# Patient Record
Sex: FEMALE | Race: WHITE | NOT HISPANIC OR LATINO | Employment: FULL TIME | URBAN - METROPOLITAN AREA
[De-identification: names, ages, dates, MRNs, and addresses within clinical notes are randomized per-mention and may not be internally consistent; named-entity substitution may affect disease eponyms.]

---

## 2023-12-27 ENCOUNTER — OFFICE VISIT (OUTPATIENT)
Dept: PRIMARY CARE | Facility: CLINIC | Age: 48
End: 2023-12-27
Payer: COMMERCIAL

## 2023-12-27 VITALS
SYSTOLIC BLOOD PRESSURE: 110 MMHG | WEIGHT: 180.78 LBS | BODY MASS INDEX: 30.08 KG/M2 | HEART RATE: 74 BPM | DIASTOLIC BLOOD PRESSURE: 68 MMHG | OXYGEN SATURATION: 98 %

## 2023-12-27 DIAGNOSIS — R39.9 UTI SYMPTOMS: Primary | ICD-10-CM

## 2023-12-27 LAB
POC APPEARANCE, URINE: ABNORMAL
POC BILIRUBIN, URINE: NEGATIVE
POC BLOOD, URINE: ABNORMAL
POC COLOR, URINE: ABNORMAL
POC GLUCOSE, URINE: NEGATIVE MG/DL
POC KETONES, URINE: ABNORMAL MG/DL
POC LEUKOCYTES, URINE: ABNORMAL
POC NITRITE,URINE: POSITIVE
POC PH, URINE: 6.5 PH
POC PROTEIN, URINE: ABNORMAL MG/DL
POC SPECIFIC GRAVITY, URINE: >=1.03
POC UROBILINOGEN, URINE: 1 EU/DL

## 2023-12-27 PROCEDURE — 87086 URINE CULTURE/COLONY COUNT: CPT

## 2023-12-27 PROCEDURE — 87186 SC STD MICRODIL/AGAR DIL: CPT

## 2023-12-27 PROCEDURE — 99213 OFFICE O/P EST LOW 20 MIN: CPT | Performed by: NURSE PRACTITIONER

## 2023-12-27 PROCEDURE — 81003 URINALYSIS AUTO W/O SCOPE: CPT | Performed by: NURSE PRACTITIONER

## 2023-12-27 RX ORDER — NITROFURANTOIN 25; 75 MG/1; MG/1
100 CAPSULE ORAL 2 TIMES DAILY
Qty: 10 CAPSULE | Refills: 0 | Status: SHIPPED | OUTPATIENT
Start: 2023-12-27 | End: 2024-01-01

## 2023-12-27 NOTE — PROGRESS NOTES
Subjective   Patient ID: Poonam Rodas is a 48 y.o. female who presents for UTI symptoms since Sunday. C/o frequency, burning. Denies jennifer blood in urine, abdominal pain, fevers, n/v.     HPI   Insomnia: She falls asleep easily around 11 pm, wakes at 4-6AM every night normally can fall back asleep after 2 hours awake. No napping. When she wakes she will read or read on her phone. Sometimes wakes due to need to urinate.      Acne: using retinol cream.   exercise induced asthma: using Proair PRN   insomnia: using benadryl 25-50 mg QHS   GYN: Dr. Alves at [facility]. Using nuvaring. Paps UTD and normal. Mammograms UTD and normal.      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 Living at home with  and 5 children youngest 10 and oldest 26 (7 children total). Everyone living in [place].   Exercise: yoga and walk/running outside 3 days/week.   Tobacco use: none   Alcohol use: socially, rarely   Review of Systems    Objective   /68   Pulse 74   Wt 82 kg (180 lb 12.4 oz)   SpO2 98%   BMI 30.08 kg/m²     Physical Exam  Constitutional:       Appearance: Normal appearance.   Abdominal:      General: Abdomen is flat.      Palpations: Abdomen is soft.      Tenderness: There is no right CVA tenderness, left CVA tenderness or guarding.   Neurological:      Mental Status: She is alert.         Assessment/Plan   Diagnoses and all orders for this visit:  UTI symptoms  -     Urine Culture; Future  -     POCT UA Automated manually resulted  -     nitrofurantoin, macrocrystal-monohydrate, (Macrobid) 100 mg capsule; Take 1 capsule (100 mg) by mouth 2 times a day for 5 days.  insomnia: restart benadryl 25-50 mg QHS. Discussed sleep hygiene. FU if needed.   acne: using retinol cream nightly   exercise induced asthma: proair prn, rarely uses.   health maintenance: paps/mammograms UTD per pt.   FU annually for cpe due 11/2/23  Patient verbalized understanding and agree to plan of care.  Instructed to call or schedule  appointment if any changes or as needed.

## 2023-12-29 LAB — BACTERIA UR CULT: ABNORMAL

## 2024-08-19 NOTE — PROGRESS NOTES
Subjective   Patient ID: Poonam Rodas is a 48 y.o. female who presents for digestive complaints. She c/o GERD, constipation, diarrhea. Typically 1 BM per day. Occassional bouts of diarrhea/constipation. No pain or discomfort. Some gas and bloating. Taking nexium PRN, but no regularly.     HPI   Insomnia: She falls asleep easily around 11 pm, wakes at 4-6AM every night normally can fall back asleep after 2 hours awake. No napping. When she wakes she will read or read on her phone. Sometimes wakes due to need to urinate.      Acne: using retinol cream.   exercise induced asthma: using Proair PRN   insomnia: using benadryl 25-50 mg QHS   GYN: Dr. Alves at [facility]. Using nuvaring. Paps UTD and normal. Mammograms UTD and normal.      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 Everyone living in [place].   Exercise: yoga and walk/running outside 3 days/week.   Tobacco use: none   Alcohol use: socially, rarely   Review of Systems    Objective   /74   Pulse 77   Wt 85 kg (187 lb 6.3 oz)   SpO2 98%   BMI 31.18 kg/m²     Physical Exam  Constitutional:       Appearance: Normal appearance.   Abdominal:      General: Abdomen is flat.      Palpations: Abdomen is soft.      Tenderness: There is no right CVA tenderness, left CVA tenderness or guarding.   Neurological:      Mental Status: She is alert.         Assessment/Plan   Diagnoses and all orders for this visit:  IBS: discussed OTC symptomatic options - stool softeners, imodium PRN, metamucil. Screening colonoscopy ordered.   GERD: start omperazole 20 mg QAM x2-8weeks; if no improvement will need EGD   insomnia: restart benadryl 25-50 mg QHS. Discussed sleep hygiene. FU if needed.   acne: using retinol cream nightly   exercise induced asthma: proair prn, rarely uses.   health maintenance: paps/mammograms UTD per pt. Colonoscopy ordered.   FU annually for cpe due  11/2/23  Patient verbalized understanding and agree to plan of care.  Instructed to call or schedule appointment if any changes or as needed.

## 2024-08-20 ENCOUNTER — APPOINTMENT (OUTPATIENT)
Dept: PRIMARY CARE | Facility: CLINIC | Age: 49
End: 2024-08-20
Payer: COMMERCIAL

## 2024-08-20 VITALS
DIASTOLIC BLOOD PRESSURE: 74 MMHG | BODY MASS INDEX: 31.18 KG/M2 | HEART RATE: 77 BPM | SYSTOLIC BLOOD PRESSURE: 114 MMHG | WEIGHT: 187.39 LBS | OXYGEN SATURATION: 98 %

## 2024-08-20 DIAGNOSIS — Z12.11 COLON CANCER SCREENING: Primary | ICD-10-CM

## 2024-08-20 DIAGNOSIS — K21.9 GASTROESOPHAGEAL REFLUX DISEASE WITHOUT ESOPHAGITIS: ICD-10-CM

## 2024-08-20 PROCEDURE — 1036F TOBACCO NON-USER: CPT | Performed by: NURSE PRACTITIONER

## 2024-08-20 PROCEDURE — 99213 OFFICE O/P EST LOW 20 MIN: CPT | Performed by: NURSE PRACTITIONER

## 2024-08-20 RX ORDER — OMEPRAZOLE 20 MG/1
20 CAPSULE, DELAYED RELEASE ORAL DAILY
Qty: 60 CAPSULE | Refills: 0 | Status: SHIPPED | OUTPATIENT
Start: 2024-08-20

## 2024-08-20 ASSESSMENT — PATIENT HEALTH QUESTIONNAIRE - PHQ9
2. FEELING DOWN, DEPRESSED OR HOPELESS: NOT AT ALL
1. LITTLE INTEREST OR PLEASURE IN DOING THINGS: NOT AT ALL
SUM OF ALL RESPONSES TO PHQ9 QUESTIONS 1 AND 2: 0

## 2024-08-20 ASSESSMENT — ENCOUNTER SYMPTOMS
LOSS OF SENSATION IN FEET: 0
DEPRESSION: 0
OCCASIONAL FEELINGS OF UNSTEADINESS: 0

## 2024-08-20 NOTE — PATIENT INSTRUCTIONS
Constipation: consider OTC stool softeners such as docusate sodium and/or miralax  Diarrhea: OK to take imodium as needed   Fiber supplement can help with regularity (metamucil)  Heart burn: omperazole 20 mg in the morning on an empty stomach for 2-8 weeks. If you cannot stop this we will want to order and EGD (upper endoscopy) which can be done at the same time as the colonoscopy    Make sure to schedule colonoscopy for colon cancer screening   Phone number [phone] [facility] CARE to schedule

## 2024-08-21 DIAGNOSIS — Z12.11 COLON CANCER SCREENING: ICD-10-CM

## 2024-08-21 RX ORDER — POLYETHYLENE GLYCOL 3350, SODIUM SULFATE ANHYDROUS, SODIUM BICARBONATE, SODIUM CHLORIDE, POTASSIUM CHLORIDE 236; 22.74; 6.74; 5.86; 2.97 G/4L; G/4L; G/4L; G/4L; G/4L
POWDER, FOR SOLUTION ORAL
Qty: 4000 ML | Refills: 0 | Status: SHIPPED | OUTPATIENT
Start: 2024-08-21

## 2024-09-19 DIAGNOSIS — K21.9 GASTROESOPHAGEAL REFLUX DISEASE WITHOUT ESOPHAGITIS: ICD-10-CM

## 2024-09-24 RX ORDER — OMEPRAZOLE 20 MG/1
20 CAPSULE, DELAYED RELEASE ORAL DAILY
Qty: 30 CAPSULE | Refills: 0 | Status: SHIPPED | OUTPATIENT
Start: 2024-09-24

## 2024-10-08 ENCOUNTER — PATIENT MESSAGE (OUTPATIENT)
Dept: PRIMARY CARE | Facility: CLINIC | Age: 49
End: 2024-10-08
Payer: COMMERCIAL

## 2024-10-16 ENCOUNTER — APPOINTMENT (OUTPATIENT)
Dept: PRIMARY CARE | Facility: CLINIC | Age: 49
End: 2024-10-16
Payer: COMMERCIAL

## 2024-10-16 DIAGNOSIS — L50.9 HIVES: Primary | ICD-10-CM

## 2024-10-16 PROCEDURE — 1036F TOBACCO NON-USER: CPT | Performed by: NURSE PRACTITIONER

## 2024-10-16 PROCEDURE — 99213 OFFICE O/P EST LOW 20 MIN: CPT | Performed by: NURSE PRACTITIONER

## 2024-10-16 RX ORDER — HYDROXYZINE HYDROCHLORIDE 25 MG/1
25 TABLET, FILM COATED ORAL 2 TIMES DAILY
Qty: 20 TABLET | Refills: 0 | Status: SHIPPED | OUTPATIENT
Start: 2024-10-16 | End: 2024-10-26

## 2024-10-16 RX ORDER — PREDNISONE 20 MG/1
20 TABLET ORAL DAILY
Qty: 7 TABLET | Refills: 0 | Status: SHIPPED | OUTPATIENT
Start: 2024-10-16 | End: 2024-10-23

## 2024-10-16 ASSESSMENT — PATIENT HEALTH QUESTIONNAIRE - PHQ9
1. LITTLE INTEREST OR PLEASURE IN DOING THINGS: NOT AT ALL
2. FEELING DOWN, DEPRESSED OR HOPELESS: NOT AT ALL
SUM OF ALL RESPONSES TO PHQ9 QUESTIONS 1 AND 2: 0

## 2024-10-16 ASSESSMENT — ENCOUNTER SYMPTOMS
OCCASIONAL FEELINGS OF UNSTEADINESS: 0
DEPRESSION: 0
DIARRHEA: 0
FATIGUE: 0
VOMITING: 0
FEVER: 0
EYE PAIN: 0
COUGH: 0
RHINORRHEA: 0
ANOREXIA: 0
LOSS OF SENSATION IN FEET: 0
NAIL CHANGES: 0
SHORTNESS OF BREATH: 0
SORE THROAT: 0

## 2024-10-16 NOTE — PROGRESS NOTES
An interactive audio and video telecommunication system which permits real time communications between the patient (at the originating site) and provider (at the distant site) was utilized to provide this telehealth service.    Verbal consent was requested and obtained from Poonam Rodas  on this date, 10/16/24  , for a telehealth visit.      Subjective   Patient ID: Poonam Rodas is a 49 y.o. female who presenting virtually with complaints of generalized hives for the past 2 weeks. She has gotten these in the past related to anxiety and stress, and thinks that is what is triggering the hives now. There are no new foods, products, hygiene products, meds. Normally hives resolve on their own, but no improvement over 2 weeks now. She has been taking zyrtec and benadryl. She is very uncomfortable. No s/sx infection.     Rash  This is a new problem. The current episode started 1 to 4 weeks ago. The problem is unchanged. The affected locations include the scalp, head, neck, chest, torso, back, abdomen, left hand, left hip, left upper leg, left leg, left ankle, right shoulder, right arm, right hand, right wrist, right fingers, right hip, right upper leg, right leg, right ankle and right foot. The rash is characterized by dryness, redness and itchiness. She was exposed to nothing. Pertinent negatives include no anorexia, congestion, cough, diarrhea, eye pain, facial edema, fatigue, fever, joint pain, nail changes, rhinorrhea, shortness of breath, sore throat or vomiting.      Insomnia: She falls asleep easily around 11 pm, wakes at 4-6AM every night normally can fall back asleep after 2 hours awake. No napping. When she wakes she will read or read on her phone. Sometimes wakes due to need to urinate.      Acne: using retinol cream.   exercise induced asthma: using Proair PRN   insomnia: using benadryl 25-50 mg QHS   GYN: Dr. Alves at [facility]. Using nuvaring. Paps UTD and normal. Mammograms UTD and normal.       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 Living at home with  and 5 children youngest 10 and oldest 26 (7 children total). Everyone living in [place].   Exercise: yoga and walk/running outside 3 days/week.   Tobacco use: none   Alcohol use: socially, rarely   Review of Systems   Constitutional:  Negative for fatigue and fever.   HENT:  Negative for congestion, rhinorrhea and sore throat.    Eyes:  Negative for pain.   Respiratory:  Negative for cough and shortness of breath.    Gastrointestinal:  Negative for anorexia, diarrhea and vomiting.   Musculoskeletal:  Negative for joint pain.   Skin:  Positive for rash. Negative for nail changes.       Objective   There were no vitals taken for this visit.    Physical Exam  Constitutional:       Appearance: Normal appearance.   Abdominal:      General: Abdomen is flat.      Palpations: Abdomen is soft.      Tenderness: There is no right CVA tenderness, left CVA tenderness or guarding.   Neurological:      Mental Status: She is alert.         Assessment/Plan   Diagnoses and all orders for this visit:    1. Hives (Primary)    - predniSONE (Deltasone) 20 mg tablet; Take 1 tablet (20 mg) by mouth once daily for 7 days.  Dispense: 7 tablet; Refill: 0  - hydrOXYzine HCL (Atarax) 25 mg tablet; Take 1 tablet (25 mg) by mouth 2 times a day for 10 days.  Dispense: 20 tablet; Refill: 0  She will call if NO improvement by Monday.     IBS: discussed OTC symptomatic options - stool softeners, imodium PRN, metamucil. Screening colonoscopy ordered.   GERD: start omperazole 20 mg QAM x2-8weeks; if no improvement will need EGD   insomnia: restart benadryl 25-50 mg QHS. Discussed sleep hygiene. FU if needed.   acne: using retinol cream nightly   exercise induced asthma: proair prn, rarely uses.   health maintenance: paps/mammograms UTD per pt. Colonoscopy ordered.   FU annually for cpe due 11/2/23  Patient verbalized understanding and agree to plan of care.  Instructed to call or  schedule appointment if any changes or as needed.

## 2024-10-17 ENCOUNTER — TELEPHONE (OUTPATIENT)
Dept: PRIMARY CARE | Facility: CLINIC | Age: 49
End: 2024-10-17

## 2024-10-17 DIAGNOSIS — K21.9 GASTROESOPHAGEAL REFLUX DISEASE WITHOUT ESOPHAGITIS: ICD-10-CM

## 2024-10-17 RX ORDER — OMEPRAZOLE 20 MG/1
20 CAPSULE, DELAYED RELEASE ORAL DAILY
Qty: 30 CAPSULE | Refills: 0 | OUTPATIENT
Start: 2024-10-17

## 2024-10-23 DIAGNOSIS — L50.1 ACUTE IDIOPATHIC URTICARIA: Primary | ICD-10-CM

## 2024-10-23 NOTE — TELEPHONE ENCOUNTER
pt still has hives and wants some instructions as to what she can do had virtual appt last week left a msg on Monday said that she is going into a [employer] holiday and will be off line tomorrow and Friday please call asap hives coming back  [phone]

## 2024-10-25 DIAGNOSIS — K21.9 GASTROESOPHAGEAL REFLUX DISEASE WITHOUT ESOPHAGITIS: ICD-10-CM

## 2024-10-28 ENCOUNTER — APPOINTMENT (OUTPATIENT)
Dept: PRIMARY CARE | Facility: CLINIC | Age: 49
End: 2024-10-28
Payer: COMMERCIAL

## 2024-10-28 VITALS
OXYGEN SATURATION: 98 % | SYSTOLIC BLOOD PRESSURE: 119 MMHG | HEIGHT: 65 IN | BODY MASS INDEX: 31.22 KG/M2 | WEIGHT: 187.39 LBS | DIASTOLIC BLOOD PRESSURE: 82 MMHG | HEART RATE: 92 BPM

## 2024-10-28 DIAGNOSIS — L50.9 HIVES: ICD-10-CM

## 2024-10-28 DIAGNOSIS — K21.9 GASTROESOPHAGEAL REFLUX DISEASE WITHOUT ESOPHAGITIS: ICD-10-CM

## 2024-10-28 DIAGNOSIS — Z12.11 COLON CANCER SCREENING: ICD-10-CM

## 2024-10-28 DIAGNOSIS — Z00.00 ANNUAL PHYSICAL EXAM: Primary | ICD-10-CM

## 2024-10-28 PROCEDURE — 90750 HZV VACC RECOMBINANT IM: CPT | Performed by: NURSE PRACTITIONER

## 2024-10-28 PROCEDURE — 90471 IMMUNIZATION ADMIN: CPT | Performed by: NURSE PRACTITIONER

## 2024-10-28 PROCEDURE — 1036F TOBACCO NON-USER: CPT | Performed by: NURSE PRACTITIONER

## 2024-10-28 PROCEDURE — 99396 PREV VISIT EST AGE 40-64: CPT | Performed by: NURSE PRACTITIONER

## 2024-10-28 PROCEDURE — 3008F BODY MASS INDEX DOCD: CPT | Performed by: NURSE PRACTITIONER

## 2024-10-28 RX ORDER — HYDROXYZINE HYDROCHLORIDE 25 MG/1
25 TABLET, FILM COATED ORAL 2 TIMES DAILY
Qty: 60 TABLET | Refills: 1 | Status: SHIPPED | OUTPATIENT
Start: 2024-10-28 | End: 2024-12-27

## 2024-10-28 RX ORDER — PREDNISONE 20 MG/1
TABLET ORAL
Qty: 30 TABLET | Refills: 0 | Status: SHIPPED | OUTPATIENT
Start: 2024-10-28

## 2024-10-28 RX ORDER — OMEPRAZOLE 20 MG/1
20 CAPSULE, DELAYED RELEASE ORAL DAILY
Qty: 30 CAPSULE | Refills: 0 | Status: SHIPPED | OUTPATIENT
Start: 2024-10-28

## 2024-10-28 ASSESSMENT — ENCOUNTER SYMPTOMS
TREMORS: 0
FEVER: 0
CHILLS: 0
MYALGIAS: 0
SEIZURES: 0
HEMATURIA: 0
WEAKNESS: 0
VOMITING: 0
COUGH: 0
NAUSEA: 0
CONFUSION: 0
EYE PAIN: 0
SLEEP DISTURBANCE: 0
LOSS OF SENSATION IN FEET: 0
FATIGUE: 0
ACTIVITY CHANGE: 0
SHORTNESS OF BREATH: 0
WHEEZING: 0
OCCASIONAL FEELINGS OF UNSTEADINESS: 0
HALLUCINATIONS: 0
DIZZINESS: 0
SORE THROAT: 0
DEPRESSION: 0
APNEA: 0
RHINORRHEA: 0
DIARRHEA: 0
DYSURIA: 0
PALPITATIONS: 0
WOUND: 0
CONSTIPATION: 0
ARTHRALGIAS: 0
ABDOMINAL PAIN: 0
FREQUENCY: 0

## 2024-10-28 ASSESSMENT — PROMIS GLOBAL HEALTH SCALE
RATE_SOCIAL_SATISFACTION: EXCELLENT
RATE_MENTAL_HEALTH: EXCELLENT
RATE_QUALITY_OF_LIFE: EXCELLENT
CARRYOUT_SOCIAL_ACTIVITIES: EXCELLENT
RATE_PHYSICAL_HEALTH: EXCELLENT
RATE_GENERAL_HEALTH: EXCELLENT
EMOTIONAL_PROBLEMS: RARELY
CARRYOUT_PHYSICAL_ACTIVITIES: COMPLETELY
RATE_AVERAGE_PAIN: 0

## 2024-10-30 ENCOUNTER — LAB (OUTPATIENT)
Dept: LAB | Facility: LAB | Age: 49
End: 2024-10-30
Payer: COMMERCIAL

## 2024-10-30 DIAGNOSIS — Z00.00 ANNUAL PHYSICAL EXAM: ICD-10-CM

## 2024-10-30 DIAGNOSIS — L50.9 HIVES: ICD-10-CM

## 2024-10-30 LAB
25(OH)D3 SERPL-MCNC: 34 NG/ML (ref 30–100)
ALBUMIN SERPL BCP-MCNC: 3.9 G/DL (ref 3.4–5)
ALP SERPL-CCNC: 100 U/L (ref 33–110)
ALT SERPL W P-5'-P-CCNC: 15 U/L (ref 7–45)
ANION GAP SERPL CALC-SCNC: 11 MMOL/L (ref 10–20)
AST SERPL W P-5'-P-CCNC: 12 U/L (ref 9–39)
BILIRUB SERPL-MCNC: 0.4 MG/DL (ref 0–1.2)
BUN SERPL-MCNC: 16 MG/DL (ref 6–23)
CALCIUM SERPL-MCNC: 9.4 MG/DL (ref 8.6–10.6)
CHLORIDE SERPL-SCNC: 105 MMOL/L (ref 98–107)
CHOLEST SERPL-MCNC: 205 MG/DL (ref 0–199)
CHOLESTEROL/HDL RATIO: 4
CO2 SERPL-SCNC: 27 MMOL/L (ref 21–32)
CREAT SERPL-MCNC: 0.8 MG/DL (ref 0.5–1.05)
CRP SERPL-MCNC: 6.11 MG/DL
EGFRCR SERPLBLD CKD-EPI 2021: 90 ML/MIN/1.73M*2
ERYTHROCYTE [DISTWIDTH] IN BLOOD BY AUTOMATED COUNT: 13.3 % (ref 11.5–14.5)
ERYTHROCYTE [SEDIMENTATION RATE] IN BLOOD BY WESTERGREN METHOD: 41 MM/H (ref 0–20)
EST. AVERAGE GLUCOSE BLD GHB EST-MCNC: 114 MG/DL
GLUCOSE SERPL-MCNC: 97 MG/DL (ref 74–99)
HBA1C MFR BLD: 5.6 %
HCT VFR BLD AUTO: 37.8 % (ref 36–46)
HDLC SERPL-MCNC: 51.1 MG/DL
HGB BLD-MCNC: 11.7 G/DL (ref 12–16)
LDLC SERPL CALC-MCNC: 117 MG/DL
MCH RBC QN AUTO: 28.7 PG (ref 26–34)
MCHC RBC AUTO-ENTMCNC: 31 G/DL (ref 32–36)
MCV RBC AUTO: 93 FL (ref 80–100)
NON HDL CHOLESTEROL: 154 MG/DL (ref 0–149)
NRBC BLD-RTO: 0 /100 WBCS (ref 0–0)
PLATELET # BLD AUTO: 339 X10*3/UL (ref 150–450)
POTASSIUM SERPL-SCNC: 4.5 MMOL/L (ref 3.5–5.3)
PROT SERPL-MCNC: 6.8 G/DL (ref 6.4–8.2)
RBC # BLD AUTO: 4.08 X10*6/UL (ref 4–5.2)
SODIUM SERPL-SCNC: 138 MMOL/L (ref 136–145)
TRIGL SERPL-MCNC: 185 MG/DL (ref 0–149)
TSH SERPL-ACNC: 2.3 MIU/L (ref 0.44–3.98)
VLDL: 37 MG/DL (ref 0–40)
WBC # BLD AUTO: 9.2 X10*3/UL (ref 4.4–11.3)

## 2024-10-30 PROCEDURE — 85652 RBC SED RATE AUTOMATED: CPT

## 2024-10-30 PROCEDURE — 82306 VITAMIN D 25 HYDROXY: CPT

## 2024-10-30 PROCEDURE — 83036 HEMOGLOBIN GLYCOSYLATED A1C: CPT

## 2024-10-30 PROCEDURE — 36415 COLL VENOUS BLD VENIPUNCTURE: CPT

## 2024-10-30 PROCEDURE — 84443 ASSAY THYROID STIM HORMONE: CPT

## 2024-10-30 PROCEDURE — 86140 C-REACTIVE PROTEIN: CPT

## 2024-10-30 PROCEDURE — 85027 COMPLETE CBC AUTOMATED: CPT

## 2024-10-30 PROCEDURE — 80053 COMPREHEN METABOLIC PANEL: CPT

## 2024-10-30 PROCEDURE — 86038 ANTINUCLEAR ANTIBODIES: CPT

## 2024-10-30 PROCEDURE — 80061 LIPID PANEL: CPT

## 2024-10-31 LAB — ANA SER QL HEP2 SUBST: NEGATIVE

## 2024-11-14 ENCOUNTER — APPOINTMENT (OUTPATIENT)
Dept: GASTROENTEROLOGY | Facility: EXTERNAL LOCATION | Age: 49
End: 2024-11-14
Payer: COMMERCIAL

## 2024-11-14 DIAGNOSIS — K44.9 HIATAL HERNIA: Primary | ICD-10-CM

## 2024-11-14 DIAGNOSIS — Z12.11 COLON CANCER SCREENING: ICD-10-CM

## 2024-11-14 DIAGNOSIS — K21.9 GASTROESOPHAGEAL REFLUX DISEASE WITHOUT ESOPHAGITIS: ICD-10-CM

## 2024-11-14 DIAGNOSIS — K31.89 OTHER DISEASES OF STOMACH AND DUODENUM: ICD-10-CM

## 2024-11-14 PROCEDURE — 43239 EGD BIOPSY SINGLE/MULTIPLE: CPT | Performed by: INTERNAL MEDICINE

## 2024-11-14 PROCEDURE — G0121 COLON CA SCRN NOT HI RSK IND: HCPCS | Performed by: INTERNAL MEDICINE

## 2024-11-14 PROCEDURE — 88305 TISSUE EXAM BY PATHOLOGIST: CPT

## 2024-11-14 PROCEDURE — 88305 TISSUE EXAM BY PATHOLOGIST: CPT | Performed by: STUDENT IN AN ORGANIZED HEALTH CARE EDUCATION/TRAINING PROGRAM

## 2024-11-15 ENCOUNTER — LAB REQUISITION (OUTPATIENT)
Dept: LAB | Facility: HOSPITAL | Age: 49
End: 2024-11-15
Payer: COMMERCIAL

## 2024-11-19 DIAGNOSIS — L50.9 HIVES: ICD-10-CM

## 2024-11-19 RX ORDER — HYDROXYZINE HYDROCHLORIDE 25 MG/1
25 TABLET, FILM COATED ORAL 2 TIMES DAILY
Qty: 180 TABLET | Refills: 0 | Status: SHIPPED | OUTPATIENT
Start: 2024-11-19

## 2024-11-22 LAB
LABORATORY COMMENT REPORT: NORMAL
PATH REPORT.FINAL DX SPEC: NORMAL
PATH REPORT.GROSS SPEC: NORMAL
PATH REPORT.RELEVANT HX SPEC: NORMAL
PATH REPORT.TOTAL CANCER: NORMAL

## 2024-11-22 NOTE — RESULT ENCOUNTER NOTE
The biopsies performed in your stomach did not show any evidence of H. pylori bacterial infection.  The recommendation is to follow-up in the office as needed.  Repeat colonoscopy in 10 years.      Chucho Begum MD

## 2024-12-30 ENCOUNTER — APPOINTMENT (OUTPATIENT)
Dept: PRIMARY CARE | Facility: CLINIC | Age: 49
End: 2024-12-30
Payer: COMMERCIAL

## 2024-12-30 DIAGNOSIS — Z23 NEED FOR SHINGLES VACCINE: ICD-10-CM

## 2024-12-30 PROCEDURE — 90471 IMMUNIZATION ADMIN: CPT | Performed by: NURSE PRACTITIONER

## 2024-12-30 PROCEDURE — 90750 HZV VACC RECOMBINANT IM: CPT | Performed by: NURSE PRACTITIONER

## 2024-12-31 ENCOUNTER — APPOINTMENT (OUTPATIENT)
Dept: ALLERGY | Facility: CLINIC | Age: 49
End: 2024-12-31
Payer: COMMERCIAL

## 2024-12-31 VITALS
HEART RATE: 103 BPM | HEIGHT: 65 IN | WEIGHT: 199 LBS | OXYGEN SATURATION: 93 % | BODY MASS INDEX: 33.15 KG/M2 | TEMPERATURE: 98.2 F

## 2024-12-31 DIAGNOSIS — L50.1 CHRONIC IDIOPATHIC URTICARIA: Primary | ICD-10-CM

## 2024-12-31 PROCEDURE — 3008F BODY MASS INDEX DOCD: CPT | Performed by: PEDIATRICS

## 2024-12-31 PROCEDURE — 99205 OFFICE O/P NEW HI 60 MIN: CPT | Performed by: PEDIATRICS

## 2024-12-31 ASSESSMENT — ENCOUNTER SYMPTOMS
VOMITING: 0
FEVER: 0
PALPITATIONS: 0
UNEXPECTED WEIGHT CHANGE: 0
EYE DISCHARGE: 0
JOINT SWELLING: 0
NAUSEA: 0
EYE ITCHING: 0
DIARRHEA: 0
RHINORRHEA: 0
BLOOD IN STOOL: 0
FACIAL SWELLING: 0
ADENOPATHY: 0
COUGH: 0
ABDOMINAL PAIN: 0

## 2024-12-31 NOTE — PROGRESS NOTES
"Patient ID: Poonam Rodas is a 49 y.o. female who [facility] for  urticaria.    Referred by EMERITA Bell      Rash: Erythematous, raised, pruritic, evanescent wheals.    Photographs: Confirm presence of urticarial lesions.  Location: The urticaria is generalized.  Timing: daily, in the morning, at night, any time of the day, not related to prandial intake  Onset: 3 months ago  Exacerbating factors: not sure  Associated symptoms: pruritus    Pertinent negatives: No collateral symptoms of anaphylaxis.  Additional comments:  no changes in diet, no daily meds (aside from the meds for hives and KILEY), no NSAIDS.  In the past, Poonam had intermittent hives associated with stress but only for a few days, not like this time... months.   Medication:      Prednisone drives away the hives by they come back.  Hydroxyzine once a day  Levocetirizine  once a day  montelukast    Doxepin (stopped)  Omeprazole     Work up:    TSH normal  SED rate high  Vitamin D normal  Ednscopy gastritis  CMP normal...    Review of Systems   Constitutional:  Negative for fever and unexpected weight change (no poor weight gain).   HENT:  Negative for ear pain, facial swelling, postnasal drip, rhinorrhea and sneezing.    Eyes:  Negative for discharge and itching.   Respiratory:  Negative for cough.    Cardiovascular:  Negative for chest pain and palpitations.   Gastrointestinal:  Negative for abdominal pain, blood in stool, diarrhea, nausea and vomiting ((no dysphagia)).   Musculoskeletal:  Negative for joint swelling.        No cold intolerance, no increased fatigue.   Skin:  Positive for rash.        No skin flushing.   Neurological:  Negative for syncope.   Hematological:  Negative for adenopathy.   All other systems reviewed and are negative.    Visit Vitals  Pulse 103   Temp 36.8 °C (98.2 °F)   Ht 1.651 m (5' 5\")   Wt 90.3 kg (199 lb)   SpO2 93%   BMI 33.12 kg/m²   Smoking Status Never   BSA 2.04 " m²        CONSTITUTIONAL: Well developed, well nourished, no acute distress.   HEAD: Normocephalic, no dysmorphic features.   EYES: No Dennie Sixto lines; no allergic shiners. Conjunctiva and sclerae are not injected.   EARS: Tympanic Membranes have normal landmarks without erythema   NOSE: the nasal mucosa is pink, nasal passages are patent, there is no discharge seen. No nasal polyps.  THROAT:  no oral lesion(s).   NECK: Normal, supple, symmetric, trachea midline.  LYMPH: No cervical lymphadenopathy or masses noted.    CARDIOVASCULAR: Regular rate, no murmur.    PULMONARY: Comfortable breathing pattern, no distress, normal aeration, clear to auscultation and no wheezing.   ABDOMEN: Soft non-tender, non-distended.   MUSCULOSKELETAL: no clubbing, cyanosis, or edema  SKIN:  no xerosis; no rash      Assessment & Plan:     Poonam Rodas is a 49 y.o. female with a history of chronic urticaria    Working diagnosis:  Chronic Autoimmune Urticaria     Poonam  has Chronic Autoimmune Urticaria (CAU).  CAU is triggered by antibodies against her allergy cells which result in sporadic hives.  The condition is not dangerous, and may resolve on its own--at 2 years, 50% of patients may go into remission.  Thyroid testing is normal.  Liver and renal profiles look fine.  ESR/CRP high - as may occur in Chronic Autoimmune Disease.  CBC looks OK.  Vitamin D is fine.    The first-line of therapy against CAU are antihistamines: high dose.  We'll also draw labs to confirm the diagnosis.    Start levo cetirizine high dose - 2 tabs PO BID  (Journal source, high dose antihistamines: Mare et al.  The effectiveness of levocetirizine and desloratadine in up to 4 times conventional doses in difficult-to-treat urticaria. TOAN 2010 Mar;125(3):676-82 )  Stop montelukast  and hydroxyzine.  If not helpful, we've discussed Xolair.    Let's touch base in a week to review the results and treatment response.     Time Spent  Prep time on day of  patient encounter: 10 minutes  Time spent directly with patient, family or caregiver: 40   minutes  Additional Time Spent on Patient Care Activities: 0 minutes  Documentation Time: 10 minutes  Other Time Spent: 0 minutes  Total: 60  minutes

## 2025-01-03 ENCOUNTER — CLINICAL SUPPORT (OUTPATIENT)
Dept: ALLERGY | Facility: CLINIC | Age: 50
End: 2025-01-03
Payer: COMMERCIAL

## 2025-01-03 DIAGNOSIS — L50.1 CHRONIC IDIOPATHIC URTICARIA: Primary | ICD-10-CM

## 2025-01-03 PROCEDURE — 96372 THER/PROPH/DIAG INJ SC/IM: CPT | Performed by: PEDIATRICS

## 2025-01-03 NOTE — PROGRESS NOTES
Subjective   Patient ID: Patient ID:  Poonam Rodas is a 49 y.o. female.    Procedures Injection       Chief Complaint: Xolair 01/03/25 10:14 AM L:RRA   [ x ] 300 mg on Right arm     She has tolerated the dose without any overt side effects.     Lot # [[id number]]  Expiration date: [09/2025]    Assessment & Plan   Chronic Spontaneous Urticaria     Continue Xolair shots once per month followed by 1 hour observation

## 2025-01-29 ENCOUNTER — OFFICE VISIT (OUTPATIENT)
Dept: ALLERGY | Facility: CLINIC | Age: 50
End: 2025-01-29
Payer: COMMERCIAL

## 2025-01-29 DIAGNOSIS — L50.1 CHRONIC IDIOPATHIC URTICARIA: Primary | ICD-10-CM

## 2025-01-29 PROCEDURE — 99214 OFFICE O/P EST MOD 30 MIN: CPT | Performed by: PEDIATRICS

## 2025-01-29 PROCEDURE — 96372 THER/PROPH/DIAG INJ SC/IM: CPT | Performed by: PEDIATRICS

## 2025-01-29 ASSESSMENT — ENCOUNTER SYMPTOMS
COUGH: 0
EYE DISCHARGE: 0
VOMITING: 0
RHINORRHEA: 0
JOINT SWELLING: 0
FATIGUE: 0
DIARRHEA: 0
WHEEZING: 0
FEVER: 0

## 2025-01-29 NOTE — LETTER
25      Patient's name: Poonam Rodas   : 1975      RE: Letter of medical necessity Xolair    To Whom It May Concern:    I am writing this letter on behalf of Poonam Rodas  - an 49 y.o. female  with chronic idiopathic urticarial and angioedema. (ICD-10: L50.1).  Poonam  has developed the chronic idiopathic hives a year ago (in ).   She has daily urticaria pruritus, sensation of skin burning, difficulty sleeping at night, and an inability to focus on the activities of daily living. Her UAS 7 score was 35.    She had tried high dose antihistamines - 4x daily recommended dose of Xyzal - without much relief.  A combination of hydroxyzine at night, Xyzal in the morning, and montelukast  daily - also failed to help her.  Prednisone was effective, but she could not continue it long-term because of the intolerable side effects.  Medicinal    Given the failure of high dose antihistaminic therapy, a decision was made to start  Poonam on Xolair.  A sample dose of Xolair was administered in my office on 1/3/2025, within 24 hours, Poonam was asymptomatic!  Over the next week, her UA S7 score was ... 0!    Xolair has gained FDA approval as an effective treatment of chronic idiopathic urticarial, and the American Academy of Allergy, Asthma, and Immunology recommends stepping up to Xolair therapy if antihistamines fail.     I would like to continue the Xolair therapy as per FDA-guidelines: 300 mg SQ once every 4 weeks.  The injections will be given in my office, and the patient will be monitored as per FDA recommendations for signs of anaphylaxis.      Please, do not hesitate to call me if you need any clarification on this matter.    Please, let us know your disposition ASAP.    Sincerely,        Diana Castellon MD  Allergy and Immunology  [address] , [place] [address]  Office: [phone]  Fax: [phone]  [email or web address]

## 2025-01-29 NOTE — PROGRESS NOTES
Subjective   Patient ID: Patient ID:  Poonam Rodas is a 49 y.o. female.    Procedures Injection       Chief Complaint: Xolair 01/29/25 11:00 AM L:RRA   [ x ] 300 mg on Left arm     She has tolerated the dose without any overt side effects.     Lot # [[id number]]  Expiration date: [04/2025]    Assessment & Plan   Chronic Spontaneous Urticaria     Continue Xolair shots once per month followed by 1 hour observation

## 2025-01-29 NOTE — PROGRESS NOTES
Poonam Rodas is a 49 y.o. female who [facility] for a follow up visit  HPI  She had a sample shot of Xolair, 300 mg subcu a month ago.  Within 2 days, she was absolutely hives free.  She is very impressed by how well it worked for her urticaria.    Her UAS7 score before Xolair was 35, after Xolair, it was down to 0.    Current medicines: She still takes hydroxyzine 50 mg at night, otherwise she does not have to be taking any meds.    Review of Systems   Constitutional:  Negative for fatigue and fever.        She had tolerated Xolair injection without any issues   HENT:  Negative for congestion, ear pain and rhinorrhea.    Eyes:  Negative for discharge.   Respiratory:  Negative for cough and wheezing.    Cardiovascular:  Negative for chest pain.   Gastrointestinal:  Negative for diarrhea and vomiting.   Musculoskeletal:  Negative for joint swelling.   Skin:  Negative for rash.         Assessment & Plan:       Poonam Rodas is a 49 y.o. female with chronic spontaneous urticaria    High-dose levocetirizine, 2 tabs by mouth twice a day, did not help.  Combination of hydroxyzine, levocetirizine, montelukast did not help.  Prednisone was helping, but she could not sustain chronic prednisone due to side effects.  UA as 7 score was 35 before starting Xolair and down to 0 once she initiated Xolair.    I would like to continue Xolair injections, 300 mg every 4 weeks.  We will begin the preapproval with the insurance.  Also continue hydroxyzine 50 mg at night.    A dose of Xolair was given in my office today.    Time Spent  Prep time on day of patient encounter: 5 minutes  Time spent directly with patient, family or caregiver: 10 minutes  Additional Time Spent on Patient Care Activities (writing a letter of medical necessity): 10 minutes  Documentation Time: 5 minutes  Other Time Spent: 0 minutes  Total: 30 minutes

## 2025-01-31 ENCOUNTER — APPOINTMENT (OUTPATIENT)
Dept: ALLERGY | Facility: CLINIC | Age: 50
End: 2025-01-31
Payer: COMMERCIAL

## 2025-02-22 DIAGNOSIS — L50.9 HIVES: ICD-10-CM

## 2025-02-24 RX ORDER — HYDROXYZINE HYDROCHLORIDE 25 MG/1
25 TABLET, FILM COATED ORAL 2 TIMES DAILY
Qty: 60 TABLET | Refills: 2 | Status: SHIPPED | OUTPATIENT
Start: 2025-02-24

## 2025-02-25 ENCOUNTER — SPECIALTY PHARMACY (OUTPATIENT)
Dept: PHARMACY | Facility: CLINIC | Age: 50
End: 2025-02-25

## 2025-02-25 PROCEDURE — RXMED WILLOW AMBULATORY MEDICATION CHARGE

## 2025-02-26 ENCOUNTER — APPOINTMENT (OUTPATIENT)
Dept: ALLERGY | Facility: CLINIC | Age: 50
End: 2025-02-26
Payer: COMMERCIAL

## 2025-02-26 ENCOUNTER — PHARMACY VISIT (OUTPATIENT)
Dept: PHARMACY | Facility: CLINIC | Age: 50
End: 2025-02-26
Payer: COMMERCIAL

## 2025-02-26 ENCOUNTER — SPECIALTY PHARMACY (OUTPATIENT)
Dept: PHARMACY | Facility: CLINIC | Age: 50
End: 2025-02-26

## 2025-02-26 DIAGNOSIS — L50.1 CHRONIC IDIOPATHIC URTICARIA: Primary | ICD-10-CM

## 2025-02-26 NOTE — PROGRESS NOTES
[facility] Clinical Note  Initial Patient Education     Introduction  Poonam Rodas is a 49 y.o. female who is on the specialty pharmacy service for management of: Asthma/Allergy Core.    Poonam Rodas is initiating the following therapy: Xolair 300mg pen under the skin every 28 days.    Medication receipt date: 2/26/2025  Duration of therapy: Maintenance    The most recent encounter visit with the referring prescriber Diana Castellon MD on 1/29/2025 was reviewed. Pharmacy will continue to collaborate in the care of this patient with the referring prescriber.    Clinical Background  An initial assessment was conducted prior to first fill of the medication to determine the appropriateness of therapy given the patient's diagnosis, medication list, comorbidities, allergies, medical history, patient's ability to self administer medication, and therapeutic goals based on possible outcomes of therapy. Refer to initial assessment task completed on 2/25/2025.    Labs/Procedures for clinical appropriateness that were reviewed include: N/A    Education/Discussion  Poonam was contacted on 2/26/2025 at 3:15 PM for a pharmacy visit with encounter number [id number] from:   [facility]  [address] [place] [id number]  Dept: [phone]  Dept Fax: [phone]  Poonam consented to a Telephone visit, which was performed.    Medication Start Date (planned or actual): Established on therapy  Education was conducted prior to start of therapy? No - Patient is new to  Specialty Pharmacy but already established on current medication therapy     Education discussed includes the following:  Patient Education  Counseled the Patient on the Following : Theraputic rationale and expected outcomes, Expected duration of therapy, Doses and administration, Adherence and missed doses, Possible side effects and management, Contraindications and precautions, Possible drug  interactions, Lab monitoring and follow-up, Safe handling, storage, and disposal, Pharmacy contact information  Learner: Patient  Education Method: Explanation  Education Response: Verbalizes understanding  Additional details of the medication specific counseling are found within the linked patient education flowsheet.     The follow up timeline was discussed. Every person responds to and reacts to therapy differently. Patient should be assessed for efficacy and tolerability in approximately: 3 months    Provided education on goals and possible outcomes of therapy:  Adherence with therapy  Timely completion of appropriate labs  Timely and appropriate follow up with provider  Identify and address medication interactions with presciption medications, OTC medications and supplements  Optimize or maintain quality of life  Asthma/Immunology: Reduce frequency of hives/itchiness (urticaria)    The importance of adherence was discussed and they were advised to take the medication as prescribed by their provider.     Impression/Plan  Review and Assessment   Reviewed During This Encounter: Medications, Problem list  Medications Assessed for Appropriate Use, Dose, Route, Frequency, and Duration: Yes  Medication Reconciliation Completed: Yes  Drug Interactions Evaluated: Yes  Clinically Relevant Drug Interactions Identified: No    This patient has not been identified as high risk due to Lack of high risk qualifiers.  The following action was taken: N/A.    QOL/Patient Satisfaction  Rate your quality of life on scale of 1-10: 10 - Completely satisfied  Rate your satisfaction with  Specialty Pharmacy on scale of 1-10: 10 - Completely satisfied (No issues reported)    The  Specialty Pharmacy Welcome packet may be viewed here:   Specialty Pharmacy Welcome Packet     Or by scanning QR code:      Provided contact information ([phone]) for [facility] and reviewed dispensing process, refill  timeline and patient management follow up. Advised to contact the pharmacy if there are any adverse effects and/or changes to medication list, including prescriptions, OTC medications, herbal products, or supplements. Confirmed understanding of education conducted during assessment. All questions and concerns were addressed and patient was encouraged to reach out for additional questions or concerns.    Franny Smith, PharmD

## 2025-02-26 NOTE — PROGRESS NOTES
Subjective   Patient ID: Patient ID:  Poonam Rodas is a 49 y.o. female.    Procedures Injection       Chief Complaint: Xolair 02/26/25 2:36 PM L:RRA   [ x ] 300 mg on Left arm     She has tolerated the dose without any overt side effects.     Lot # [[id number]]  Expiration date: [11/2025]    Assessment & Plan   Chronic Spontaneous Urticaria     Continue Xolair shots once per month followed by 3 minute observation

## 2025-03-19 ENCOUNTER — SPECIALTY PHARMACY (OUTPATIENT)
Dept: PHARMACY | Facility: CLINIC | Age: 50
End: 2025-03-19

## 2025-03-19 ENCOUNTER — PHARMACY VISIT (OUTPATIENT)
Dept: PHARMACY | Facility: CLINIC | Age: 50
End: 2025-03-19
Payer: COMMERCIAL

## 2025-03-19 PROCEDURE — RXMED WILLOW AMBULATORY MEDICATION CHARGE

## 2025-03-24 ENCOUNTER — SPECIALTY PHARMACY (OUTPATIENT)
Dept: PHARMACY | Facility: CLINIC | Age: 50
End: 2025-03-24

## 2025-03-26 ENCOUNTER — APPOINTMENT (OUTPATIENT)
Dept: ALLERGY | Facility: CLINIC | Age: 50
End: 2025-03-26
Payer: COMMERCIAL

## 2025-03-26 DIAGNOSIS — L50.1 CHRONIC IDIOPATHIC URTICARIA: Primary | ICD-10-CM

## 2025-03-26 PROCEDURE — 96372 THER/PROPH/DIAG INJ SC/IM: CPT | Performed by: PEDIATRICS

## 2025-03-26 NOTE — PROGRESS NOTES
Subjective   Patient ID: Patient ID:  Poonam Rodas is a 49 y.o. female.    Procedures Injection       Chief Complaint: Xolair 03/26/25 2:35 PM R:RRA   [ x ] 300 mg on Right arm     She has tolerated the dose without any overt side effects.     Lot # [[id number]]  Expiration date: [11/2025]    Assessment & Plan   Chronic Spontaneous Urticaria     Continue Xolair shots once per month followed by 3 minute observation

## 2025-04-09 ENCOUNTER — OFFICE VISIT (OUTPATIENT)
Dept: PRIMARY CARE | Facility: CLINIC | Age: 50
End: 2025-04-09
Payer: COMMERCIAL

## 2025-04-09 VITALS
SYSTOLIC BLOOD PRESSURE: 116 MMHG | BODY MASS INDEX: 32.28 KG/M2 | WEIGHT: 194 LBS | DIASTOLIC BLOOD PRESSURE: 72 MMHG | HEART RATE: 79 BPM | OXYGEN SATURATION: 96 %

## 2025-04-09 DIAGNOSIS — R39.9 UTI SYMPTOMS: ICD-10-CM

## 2025-04-09 LAB
POC APPEARANCE, URINE: CLEAR
POC BILIRUBIN, URINE: NEGATIVE
POC BLOOD, URINE: ABNORMAL
POC COLOR, URINE: YELLOW
POC GLUCOSE, URINE: NEGATIVE MG/DL
POC KETONES, URINE: NEGATIVE MG/DL
POC LEUKOCYTES, URINE: ABNORMAL
POC NITRITE,URINE: NEGATIVE
POC PH, URINE: 5.5 PH
POC PROTEIN, URINE: NEGATIVE MG/DL
POC SPECIFIC GRAVITY, URINE: 1.02
POC UROBILINOGEN, URINE: 0.2 EU/DL

## 2025-04-09 PROCEDURE — 81003 URINALYSIS AUTO W/O SCOPE: CPT | Performed by: NURSE PRACTITIONER

## 2025-04-09 PROCEDURE — 1036F TOBACCO NON-USER: CPT | Performed by: NURSE PRACTITIONER

## 2025-04-09 PROCEDURE — 99213 OFFICE O/P EST LOW 20 MIN: CPT | Performed by: NURSE PRACTITIONER

## 2025-04-09 RX ORDER — NITROFURANTOIN 25; 75 MG/1; MG/1
100 CAPSULE ORAL 2 TIMES DAILY
Qty: 10 CAPSULE | Refills: 0 | Status: SHIPPED | OUTPATIENT
Start: 2025-04-09 | End: 2025-04-14

## 2025-04-09 ASSESSMENT — PATIENT HEALTH QUESTIONNAIRE - PHQ9
SUM OF ALL RESPONSES TO PHQ9 QUESTIONS 1 AND 2: 0
2. FEELING DOWN, DEPRESSED OR HOPELESS: NOT AT ALL
1. LITTLE INTEREST OR PLEASURE IN DOING THINGS: NOT AT ALL

## 2025-04-09 ASSESSMENT — ENCOUNTER SYMPTOMS
OCCASIONAL FEELINGS OF UNSTEADINESS: 0
LOSS OF SENSATION IN FEET: 0
DEPRESSION: 0

## 2025-04-09 NOTE — PROGRESS NOTES
Subjective   Patient ID: Poonam Rodas is a 49 y.o. female presenting for urinary frequency and incomplete emptying of bladder  for the past 3 days. Denies hematuria, dysuria, pelvic pain, fevers, n/v. Hx UTIs, last one 2023.     HPI:  Hives: generalized. She has gotten these in the past related to anxiety and stress, and thinks that is what is triggering the hives now. There are no new foods, products, hygiene products, meds. Normally hives resolve on their own. There was no response to zyrtec and benadryl. She is very uncomfortable. No s/sx infection.   GERD: Trial of PPI x2 mo, unable to stop medication, ordered EGD to do with colonoscopy   Insomnia: She falls asleep easily around 11 pm, wakes at 4-6AM every night normally can fall back asleep after 2 hours awake. No napping. When she wakes she will read or read on her phone. Sometimes wakes due to need to urinate.      Acne: using retinol cream.   exercise induced asthma: using Proair PRN   insomnia: using benadryl 25-50 mg QHS   GYN: Dr. Alves at [facility]. Using nuvaring. Paps UTD and normal. Mammograms UTD and normal.      SOCIAL: Working as Christian educator. Living at home with  and 2 children youngest 14 and 16 y/o at home. oldest 30 (7 children total). 14 month old grandson. Everyone living in [place]. 1 daughter living in [facility].   Exercise: yoga and walk/running outside 3 days/week.   Tobacco use: none   Alcohol use: socially, rarely   Review of Systems    Objective   /72   Pulse 79   Wt 88 kg (194 lb 0.1 oz)   SpO2 96%   BMI 32.28 kg/m²     Physical Exam  Constitutional:       Appearance: Normal appearance.   Abdominal:      General: Abdomen is flat. There is no distension.      Palpations: Abdomen is soft.      Tenderness: There is no abdominal tenderness. There is no right CVA tenderness or left CVA tenderness.   Neurological:      Mental Status: She is alert.       Assessment/Plan   Diagnoses and all orders for this  visit:   1. UTI symptoms  - POCT UA Automated manually resulted positive for blood and leuks.   - Urine Culture; Future - pending   - nitrofurantoin, macrocrystal-monohydrate, (Macrobid) 100 mg capsule; Take 1 capsule (100 mg) by mouth 2 times a day for 5 days.  Dispense: 10 capsule; Refill: 0  Colon cancer screening, GERD  - Colonoscopy Screening; Average Risk Patient; Future  - Esophagogastroduodenoscopy (EGD); Future    IBS: discussed OTC symptomatic options - stool softeners, imodium PRN, metamucil. Screening colonoscopy ordered.   GERD: Trial of PPI x2 mo, unable to stop medication, ordered EGD to do with colonoscopy. OK to resume following EGD.   insomnia: restart benadryl 25-50 mg QHS. Discussed sleep hygiene.   acne: using retinol cream nightly   exercise induced asthma: proair prn, rarely uses.   health maintenance: paps/mammograms UTD per pt. Colonoscopy ordered.   FU annually for CPE due 10/28/2025

## 2025-04-12 LAB — BACTERIA UR CULT: ABNORMAL

## 2025-04-16 PROCEDURE — RXMED WILLOW AMBULATORY MEDICATION CHARGE

## 2025-04-17 ENCOUNTER — SPECIALTY PHARMACY (OUTPATIENT)
Dept: PHARMACY | Facility: CLINIC | Age: 50
End: 2025-04-17

## 2025-04-17 NOTE — PROGRESS NOTES
Spoke to patient and her . Patient stated her  will help with Xolair home injections. Counseled him on storage, administration, disposal, and pharmacy contact info. Answered all questions. Pharmacy delivery set for 4/22/2025. Next dose due around 4/23/2025 based on previous report from patient.

## 2025-04-18 ENCOUNTER — PHARMACY VISIT (OUTPATIENT)
Dept: PHARMACY | Facility: CLINIC | Age: 50
End: 2025-04-18
Payer: COMMERCIAL

## 2025-05-12 ENCOUNTER — DOCUMENTATION (OUTPATIENT)
Dept: OPHTHALMOLOGY | Facility: CLINIC | Age: 50
End: 2025-05-12
Payer: COMMERCIAL

## 2025-05-12 DIAGNOSIS — L03.213 PRESEPTAL CELLULITIS OF RIGHT UPPER EYELID: Primary | ICD-10-CM

## 2025-05-12 RX ORDER — AMOXICILLIN AND CLAVULANATE POTASSIUM 500; 125 MG/1; MG/1
1 TABLET, FILM COATED ORAL 2 TIMES DAILY
Qty: 20 TABLET | Refills: 0 | Status: SHIPPED | OUTPATIENT
Start: 2025-05-12 | End: 2025-05-22

## 2025-05-13 ENCOUNTER — SPECIALTY PHARMACY (OUTPATIENT)
Dept: PHARMACY | Facility: CLINIC | Age: 50
End: 2025-05-13

## 2025-05-13 PROCEDURE — RXMED WILLOW AMBULATORY MEDICATION CHARGE

## 2025-05-16 ENCOUNTER — PHARMACY VISIT (OUTPATIENT)
Dept: PHARMACY | Facility: CLINIC | Age: 50
End: 2025-05-16
Payer: COMMERCIAL

## 2025-05-21 DIAGNOSIS — L50.9 HIVES: ICD-10-CM

## 2025-05-21 RX ORDER — HYDROXYZINE HYDROCHLORIDE 25 MG/1
25 TABLET, FILM COATED ORAL 2 TIMES DAILY
Qty: 60 TABLET | Refills: 2 | Status: SHIPPED | OUTPATIENT
Start: 2025-05-21

## 2025-06-09 ENCOUNTER — SPECIALTY PHARMACY (OUTPATIENT)
Dept: PHARMACY | Facility: CLINIC | Age: 50
End: 2025-06-09

## 2025-06-09 NOTE — PROGRESS NOTES
[facility] Clinical Note  Patient Reassessment     Introduction  Poonam Rodas is a 49 y.o. female who is on the specialty pharmacy service for management of: Pulmonology Core.      [facility] supplied medication: Xolair 300mg under the skin every 28 days    Duration of therapy: Maintenance    The most recent encounter visit with the referring prescriber Diana Castellon MD on 1/29/2025 was reviewed. Pharmacy will continue to collaborate in the care of this patient with the referring prescriber.    Discussion  Poonam was contacted on 6/9/2025 at 2:30 PM for a pharmacy visit with encounter number [id number] from:   [facility]  [address] [place] [id number]  Dept: [phone]  Dept Fax: [phone]  Poonam consented to a Telephone visit, which was performed.    Efficacy  Patient has developed new symptoms of condition: No  Patient/caregiver feels medication is affecting the disease state: Reports no recent urticaria/hives on Xolair    Goals  Provided education on goals and possible outcomes of therapy:  Adherence with therapy  Timely completion of appropriate labs  Timely and appropriate follow up with provider  Identify and address medication interactions with presciption medications, OTC medications and supplements  Optimize or maintain quality of life  Asthma/Immunology: Reduce frequency of hives/itchiness (urticaria)  Patient has documented target(s) for goals of therapy: Yes  Patient status for goal(s): On track    Tolerance  Patient has experienced side effects from this medication: No  Changes to current therapy regimen: No    The follow-up timeline was discussed. Every person responds to and reacts to therapy differently. Patient should be assessed for efficacy and tolerability in approximately: 3 months    Adherence  Patient Information  Informant: Self (Patient)  Demonstrates Understanding of Importance of Adherence: Yes  Does the patient  have any barriers to self-administration (including physical and mental?): No  Action Taken to Mitigate Barriers for Self-Administration:  helps inject  Support Network for Adherence: Family Member  Adherence Tools Used: Calendar  Medication Information  Medication: omalizumab (Xolair)  Patient Reported Missed Doses in the Last 4 Weeks: 0  Estimated Medication Adherence Level: Good  Adherence Estimation Source: Claims history (Patient)  Barriers to Adherence: No Problems identified   The importance of adherence was discussed and patient/caregiver was advised to take the medication as prescribed by their provider. Encouraged patient/caregiver to call physician's office or specialty pharmacy if they have a question regarding a missed dose.    General Assessment  Changes to home medications, OTCs or supplements: No  Current Medications[1]  Reported new allergies: No  Reported new medical conditions: No  Additional monitoring reviewed: Pharmacy MyChart process for scheduling deliveries  Is laboratory follow up needed? Per MD    Advised to contact the pharmacy if there are any changes to the patient's medication list, including prescriptions, OTC medications, herbal products, or supplements.    Impression/Plan  This patient has not been identified as high risk due to Lack of high risk qualifiers.  The following action was taken: N/A.    QOL/Patient Satisfaction  Rate your quality of life on scale of 1-10: 10 - Completely satisfied  Rate your satisfaction with  Specialty Pharmacy on scale of 1-10: 10 - Completely satisfied    Provided contact information ([phone]) for [facility] and reviewed dispensing process, refill timeline and patient management follow up. Confirmed understanding of education conducted during assessment. All questions and concerns were addressed and patient/caregiver was encouraged to reach out for additional questions or concerns.    Based on the patient's  diagnosis, medication list, progress towards goals, adherence, tolerance, and medication list, medication remains appropriate: Therapy remains appropriate (I attest)    Franny Smith, PharmD        [1]   Current Outpatient Medications   Medication Sig Dispense Refill    hydrOXYzine HCL (Atarax) 25 mg tablet TAKE 1 TABLET BY MOUTH TWICE A DAY 60 tablet 2    omalizumab (Xolair) 300 mg/2 mL subcutaneous auto-injector Inject 2 mL (300 mg) under the skin every 28 (twenty-eight) days. 2 mL 11    omeprazole (PriLOSEC) 20 mg DR capsule TAKE 1 CAPSULE (20 MG) BY MOUTH ONCE DAILY. DO NOT CRUSH OR CHEW. 30 capsule 0    polyethylene glycol (GoLYTELY) 236-22.74-6.74 -5.86 gram solution Drink 1/2 starting at 6 pm the night before your procedure then drink the 2nd 1/2 5 hours before procedure arrival time 4000 mL 0    predniSONE (Deltasone) 20 mg tablet Take 1 tablet three times a day for 5 days, then 1 tablet twice a day for 5 days, then 1 tablet daily for 5 days. 30 tablet 0     No current facility-administered medications for this visit.

## 2025-06-10 ENCOUNTER — SPECIALTY PHARMACY (OUTPATIENT)
Dept: PHARMACY | Facility: CLINIC | Age: 50
End: 2025-06-10

## 2025-06-10 PROCEDURE — RXMED WILLOW AMBULATORY MEDICATION CHARGE

## 2025-06-12 ENCOUNTER — SPECIALTY PHARMACY (OUTPATIENT)
Dept: PHARMACY | Facility: CLINIC | Age: 50
End: 2025-06-12

## 2025-06-16 ENCOUNTER — PHARMACY VISIT (OUTPATIENT)
Dept: PHARMACY | Facility: CLINIC | Age: 50
End: 2025-06-16
Payer: COMMERCIAL

## 2025-07-10 PROCEDURE — RXMED WILLOW AMBULATORY MEDICATION CHARGE

## 2025-07-15 ENCOUNTER — PHARMACY VISIT (OUTPATIENT)
Dept: PHARMACY | Facility: CLINIC | Age: 50
End: 2025-07-15
Payer: COMMERCIAL

## 2025-07-23 DIAGNOSIS — Z12.31 ENCOUNTER FOR SCREENING MAMMOGRAM FOR BREAST CANCER: ICD-10-CM

## 2025-08-04 ENCOUNTER — SPECIALTY PHARMACY (OUTPATIENT)
Dept: PHARMACY | Facility: CLINIC | Age: 50
End: 2025-08-04

## 2025-08-06 PROCEDURE — RXMED WILLOW AMBULATORY MEDICATION CHARGE

## 2025-08-08 ENCOUNTER — APPOINTMENT (OUTPATIENT)
Dept: RADIOLOGY | Facility: CLINIC | Age: 50
End: 2025-08-08
Payer: COMMERCIAL

## 2025-08-12 ENCOUNTER — APPOINTMENT (OUTPATIENT)
Dept: PRIMARY CARE | Facility: CLINIC | Age: 50
End: 2025-08-12
Payer: COMMERCIAL

## 2025-08-13 ENCOUNTER — PHARMACY VISIT (OUTPATIENT)
Dept: PHARMACY | Facility: CLINIC | Age: 50
End: 2025-08-13
Payer: COMMERCIAL

## 2025-08-15 DIAGNOSIS — L50.9 HIVES: ICD-10-CM

## 2025-08-17 RX ORDER — HYDROXYZINE HYDROCHLORIDE 25 MG/1
25 TABLET, FILM COATED ORAL 2 TIMES DAILY
Qty: 60 TABLET | Refills: 2 | Status: SHIPPED | OUTPATIENT
Start: 2025-08-17

## 2025-09-08 ENCOUNTER — APPOINTMENT (OUTPATIENT)
Dept: RADIOLOGY | Facility: CLINIC | Age: 50
End: 2025-09-08
Payer: COMMERCIAL

## 2025-10-03 ENCOUNTER — APPOINTMENT (OUTPATIENT)
Dept: PRIMARY CARE | Facility: CLINIC | Age: 50
End: 2025-10-03
Payer: COMMERCIAL

## 2025-10-20 ENCOUNTER — APPOINTMENT (OUTPATIENT)
Dept: RADIOLOGY | Facility: CLINIC | Age: 50
End: 2025-10-20
Payer: COMMERCIAL